# Patient Record
Sex: FEMALE | Race: WHITE
[De-identification: names, ages, dates, MRNs, and addresses within clinical notes are randomized per-mention and may not be internally consistent; named-entity substitution may affect disease eponyms.]

---

## 2017-01-09 ENCOUNTER — HOSPITAL ENCOUNTER (OUTPATIENT)
Dept: HOSPITAL 47 - RADUSWWP | Age: 27
Discharge: HOME | End: 2017-01-09
Payer: COMMERCIAL

## 2017-01-09 DIAGNOSIS — N64.4: Primary | ICD-10-CM

## 2017-01-09 NOTE — USB
Reason for exam: clinical finding.

Indicated problem(s): pain in the left breast.



Physical Findings:

Nurse did not find any significant physical abnormalities on exam.



US Breast LT

Left breast ultrasound including all four quadrants, the retroareolar region and 

axilla demonstrates no cystic or solid lesion seen.



These results were verbally communicated with the patient and result sheet given 

to the patient on 1/9/17.





ASSESSMENT: Negative, BI-RAD 1



RECOMMENDATION:

Routine screening mammogram of both breasts at age 40.

Manage patient on a clinical basis.

## 2019-07-15 ENCOUNTER — HOSPITAL ENCOUNTER (EMERGENCY)
Dept: HOSPITAL 47 - EC | Age: 29
Discharge: HOME | End: 2019-07-15
Payer: COMMERCIAL

## 2019-07-15 VITALS
TEMPERATURE: 98.2 F | SYSTOLIC BLOOD PRESSURE: 122 MMHG | HEART RATE: 71 BPM | DIASTOLIC BLOOD PRESSURE: 80 MMHG | RESPIRATION RATE: 18 BRPM

## 2019-07-15 DIAGNOSIS — Z88.2: ICD-10-CM

## 2019-07-15 DIAGNOSIS — F41.9: ICD-10-CM

## 2019-07-15 DIAGNOSIS — R06.00: ICD-10-CM

## 2019-07-15 DIAGNOSIS — J02.9: Primary | ICD-10-CM

## 2019-07-15 DIAGNOSIS — F17.200: ICD-10-CM

## 2019-07-15 PROCEDURE — 99284 EMERGENCY DEPT VISIT MOD MDM: CPT

## 2019-07-15 NOTE — ED
ENT HPI





- General


Chief complaint: ENT


Stated complaint: SORE THROAT, MICHELLE


Time Seen by Provider: 07/15/19 10:10


Source: patient


Mode of arrival: ambulatory


Limitations: no limitations





- History of Present Illness


Initial comments: 





Patient is a 28-year-old female who presents emergency Department with 

complaints of "throat tightening" 2 weeks.  Patient states she went to [facility] on Saturday for the same issue, had a rapid strep test which was negative,

but they gave her a prescription for amoxicillin and steroids.  Patient states 

she did not start the medications, called her doctor today has an appointment 

for later today but states she was getting really worked up and decided to come 

to the ER as well.  Patient states she has a history of anxiety but currently 

does not take any medications for.  Patient denies having fever, chills, cough, 

runny nose, shortness of breath, difficulty breathing, nausea, vomiting.  

Patient denies any other complaints at this time.





- Related Data


                                Home Medications











 Medication  Instructions  Recorded  Confirmed


 


No Known Home Medications  07/15/19 07/15/19











                                    Allergies











Allergy/AdvReac Type Severity Reaction Status Date / Time


 


sulfamethoxazole AdvReac  Nausea & Verified 07/15/19 10:06





[From Bactrim]   Vomiting  


 


trimethoprim [From Bactrim] AdvReac  Nausea & Verified 07/15/19 10:06





   Vomiting  














Review of Systems


ROS Statement: 


Those systems with pertinent positive or pertinent negative responses have been 

documented in the HPI.





ROS Other: All systems not noted in ROS Statement are negative.





Past Medical History


Past Medical History: Thyroid Disorder


Additional Past Medical History / Comment(s): migraines


History of Any Multi-Drug Resistant Organisms: None Reported


Past Surgical History: No Surgical Hx Reported


Past Psychological History: Anxiety


Smoking Status: Current every day smoker


Past Alcohol Use History: Occasional


Past Drug Use History: None Reported





General Exam





- General Exam Comments


Initial Comments: 





GENERAL: Well-appearing, well-nourished and in no acute distress.


HEAD: Atraumatic, normocephalic.


EYES: Pupils equal round and reactive to light, extraocular movements intact, 

sclera anicteric, conjunctiva are normal.


ENT: TMs normal, nares patent, oropharynx clear without exudates.  Moist mucous 

membranes.


NECK: Normal range of motion, supple without lymphadenopathy or JVD.


LUNGS: Breath sounds clear to auscultation bilaterally and equal.  No wheezes 

rales or rhonchi.


HEART: Regular rate and rhythm without murmurs, rubs or gallops.


ABDOMEN: Soft, nontender, normoactive bowel sounds.  No guarding, no rebound.  

No masses appreciated.


: Deferred 


EXTREMITIES: Normal range of motion, no pitting or edema.  No clubbing or 

cyanosis.


NEUROLOGICAL: Cranial nerves II through XII grossly intact.  Normal speech, 

normal gait.


PSYCH: Normal mood, normal affect.


SKIN: Warm, Dry, normal turgor, no rashes or lesions noted.


Limitations: no limitations





Course


                                   Vital Signs











  07/15/19





  09:56


 


Temperature 98.2 F


 


Pulse Rate 71


 


Respiratory 18





Rate 


 


Blood Pressure 122/80


 


O2 Sat by Pulse 98





Oximetry 














Medical Decision Making





- Medical Decision Making





Patient is a 28-year-old female presenting with sore throat 2 weeks.  Patient 

has history of anxiety as well.  Patient is describing her symptoms as, "throat 

tightening".  Patient went to [facility] on Saturday had rapid strep test 

which was negative and was prescribed amoxicillin and steroids.  Patient did not

take the medications.  Patient is exam today is completely within normal limits.

 Patient denies fever, chills, shortness of breath, difficulty breathing.  Was 

discussed with patient that her symptoms are probably related to her anxiety.  

Patient has an appointment with her PCP later on today.  We discussed going to 

appointment before starting any medications.  Patient will be discharged home.  

Patient is okay with this plan.  Return parameters were discussed and she 

verbalized understanding.  Case discussed with Dr. Banks.





Disposition


Clinical Impression: 


 Sore throat, Anxiety





Disposition: HOME SELF-CARE


Condition: Stable


Instructions (If sedation given, give patient instructions):  Anxiety (ED)


Additional Instructions: 


Please return to the Emergency Department if symptoms worsen or any other 

concerns.


Follow-up with PCP as discussed.


Is patient prescribed a controlled substance at d/c from ED?: No


Referrals: 


Swapna Menon MD [Primary Care Provider] - 1-2 days

## 2019-09-25 ENCOUNTER — HOSPITAL ENCOUNTER (EMERGENCY)
Dept: HOSPITAL 47 - EC | Age: 29
Discharge: HOME | End: 2019-09-25
Payer: COMMERCIAL

## 2019-09-25 VITALS — HEART RATE: 77 BPM | DIASTOLIC BLOOD PRESSURE: 87 MMHG | SYSTOLIC BLOOD PRESSURE: 106 MMHG

## 2019-09-25 VITALS — TEMPERATURE: 98.4 F | RESPIRATION RATE: 16 BRPM

## 2019-09-25 DIAGNOSIS — F41.9: ICD-10-CM

## 2019-09-25 DIAGNOSIS — R51: Primary | ICD-10-CM

## 2019-09-25 DIAGNOSIS — Z53.20: ICD-10-CM

## 2019-09-25 DIAGNOSIS — Z86.69: ICD-10-CM

## 2019-09-25 DIAGNOSIS — F32.9: ICD-10-CM

## 2019-09-25 DIAGNOSIS — Z88.1: ICD-10-CM

## 2019-09-25 DIAGNOSIS — Z88.2: ICD-10-CM

## 2019-09-25 DIAGNOSIS — R11.0: ICD-10-CM

## 2019-09-25 DIAGNOSIS — Z79.899: ICD-10-CM

## 2019-09-25 DIAGNOSIS — H53.149: ICD-10-CM

## 2019-09-25 DIAGNOSIS — F17.200: ICD-10-CM

## 2019-09-25 PROCEDURE — 99284 EMERGENCY DEPT VISIT MOD MDM: CPT

## 2019-09-25 PROCEDURE — 96375 TX/PRO/DX INJ NEW DRUG ADDON: CPT

## 2019-09-25 PROCEDURE — 96365 THER/PROPH/DIAG IV INF INIT: CPT

## 2019-09-25 PROCEDURE — 70450 CT HEAD/BRAIN W/O DYE: CPT

## 2019-09-25 NOTE — ED
Headache HPI





- General


Chief Complaint: Headache


Stated Complaint: headache


Time Seen by Provider: 09/25/19 17:48


Source: RN notes reviewed, old records reviewed


Mode of arrival: ambulatory


Limitations: no limitations





- History of Present Illness


Initial Comments: 





This is a 20-year-old female the ER for evaluation.  Patient resents today for 

evaluation regards to headache.  History of headache history of migraines.  

Patient takes using over-the-counter medication symptoms resolved his headache 

is been for a month.  Prior history of MRI showing no issues no aneurysm.  

Patient has current headache with mild nausea.  No current vomiting.  No trauma.

 No fevers she was treated for frontal sinusitis with antibiotics those have 

resolved and patient's headache has remained


MD Complaint: headache


-: month(s)


Onset Description: gradual


Location: frontal


Severity: mild


Severity scale (1-10): 3


Quality: aching, throbbing


Consistency: constant


Improves With: nothing


Worsens With: none


Associated Symptoms: nausea, photophobia, sensitivity to sound


Treatments Prior to Arrival: none





- Related Data


                                Home Medications











 Medication  Instructions  Recorded  Confirmed


 


Escitalopram [Lexapro] 5 mg PO HS 09/25/19 09/25/19











                                    Allergies











Allergy/AdvReac Type Severity Reaction Status Date / Time


 


sulfamethoxazole AdvReac  Nausea & Verified 09/25/19 18:26





[From Bactrim]   Vomiting  


 


trimethoprim [From Bactrim] AdvReac  Nausea & Verified 09/25/19 18:26





   Vomiting  














Review of Systems


ROS Statement: 


Those systems with pertinent positive or pertinent negative responses have been 

documented in the HPI.





ROS Other: All systems not noted in ROS Statement are negative.





Past Medical History


Past Medical History: Thyroid Disorder


Additional Past Medical History / Comment(s): migraines


History of Any Multi-Drug Resistant Organisms: None Reported


Past Surgical History: No Surgical Hx Reported


Past Psychological History: Anxiety, Depression


Smoking Status: Current every day smoker


Past Alcohol Use History: None Reported


Past Drug Use History: None Reported





General Exam


Limitations: no limitations


General appearance: alert, in no apparent distress


Head exam: Present: atraumatic, normocephalic, normal inspection


Eye exam: Present: normal appearance, PERRL, EOMI.  Absent: scleral icterus, 

conjunctival injection, periorbital swelling


ENT exam: Present: normal exam, mucous membranes moist


Neck exam: Present: normal inspection.  Absent: tenderness, meningismus, 

lymphadenopathy


Respiratory exam: Present: normal lung sounds bilaterally.  Absent: respiratory 

distress, wheezes, rales, rhonchi, stridor


Cardiovascular Exam: Present: regular rate, normal rhythm, normal heart sounds. 

Absent: systolic murmur, diastolic murmur, rubs, gallop, clicks


GI/Abdominal exam: Present: soft, normal bowel sounds.  Absent: distended, 

tenderness, guarding, rebound, rigid


Extremities exam: Present: normal inspection, full ROM, normal capillary refill.

 Absent: tenderness, pedal edema, joint swelling, calf tenderness


Back exam: Present: normal inspection


Neurological exam: Present: alert, oriented X3, CN II-XII intact


Psychiatric exam: Present: normal affect, normal mood


Skin exam: Present: warm, dry, intact, normal color.  Absent: rash





Course


                                   Vital Signs











  09/25/19





  17:13


 


Temperature 98.4 F


 


Pulse Rate 88


 


Respiratory 16





Rate 


 


Blood Pressure 111/63


 


O2 Sat by Pulse 99





Oximetry 














- Reevaluation(s)


Reevaluation #1: 





09/25/19 18:43


Record is reviewed including prior MRI


Reevaluation #2: 





09/25/19 19:09


patient headache is improved





Medical Decision Making





- Medical Decision Making





8 female the ER with multiple headache.  CT brain is negative for acute disease 

headache is resolved and patient can be discharged home





- Radiology Data


Radiology results: report reviewed (CT brain is negative for acute disease), 

image reviewed





Disposition


Clinical Impression: 


 Headache





Disposition: HOME SELF-CARE


Instructions (If sedation given, give patient instructions):  Acute Headache 

(ED)


Is patient prescribed a controlled substance at d/c from ED?: No


Referrals: 


Swapna Menon MD [Primary Care Provider] - 1-2 days

## 2019-09-25 NOTE — CT
EXAMINATION TYPE: CT brain wo con

 

DATE OF EXAM: 9/25/2019

 

COMPARISON: NONE

 

HISTORY: HA x1 month

 

CT DLP: 1076.4 mGycm.  Automated Exposure Control for Dose Reduction was Utilized.

 

 

TECHNIQUE: CT scan of the head is performed without contrast.

 

FINDINGS:   There is no acute intracranial hemorrhage, mass effect, or midline shift identified. No s
uspicious extra axial fluid collection. The ventricles and sulci are within normal limits in size.  T
he globes are intact and the visualized sinuses are clear. Leftward nasal septal spur is incidentally
 seen. Calvarium appears intact.

 

IMPRESSION:  No acute intracranial hemorrhage, mass effect, or midline shift is seen. If headaches pe
rsist MRI would be recommended.

## 2020-10-29 ENCOUNTER — HOSPITAL ENCOUNTER (OUTPATIENT)
Dept: HOSPITAL 47 - RADUSWWP | Age: 30
Discharge: HOME | End: 2020-10-29
Attending: FAMILY MEDICINE
Payer: COMMERCIAL

## 2020-10-29 DIAGNOSIS — R10.811: Primary | ICD-10-CM

## 2020-10-29 PROCEDURE — 76700 US EXAM ABDOM COMPLETE: CPT

## 2020-10-29 NOTE — US
EXAMINATION TYPE: US abdomen complete

 

DATE OF EXAM: 10/29/2020

 

COMPARISON: NONE

 

CLINICAL HISTORY: R10.811 Right upper quadrant abdominal tenderness. Intermittent RUQ pain x 1 months
, change in bowel habits.

 

EXAM MEASUREMENTS:

 

Liver Length:  11.4 cm   

Gallbladder Wall:  0.2 cm   

CBD:  0.3 cm

Spleen:  9.6 cm   

Right Kidney:  8.3 x 5.2 x 4.3 cm 

Left Kidney:  9.3 x 4.8 x 4.8 cm   

 

 

 

Pancreas:  Normal where visualized; tail obscured by overlying bowel gas

Liver: Normal.

Gallbladder:  Normal

**Sonographer reports negative sonographic Landin sign

CBD:  Normal 

Spleen:  Normal   

Right Kidney:  Normal   

Left Kidney:  Normal   

Upper IVC:  Normal  

Abd Aorta:  Normal

 

 

IMPRESSION: No acute findings to explain patient's right upper quadrant pain. No cholelithiasis or ac
imguel cholecystitis.

## 2024-09-09 ENCOUNTER — HOSPITAL ENCOUNTER (EMERGENCY)
Dept: HOSPITAL 47 - EC | Age: 34
LOS: 1 days | Discharge: HOME | End: 2024-09-10
Payer: COMMERCIAL

## 2024-09-09 VITALS — RESPIRATION RATE: 18 BRPM

## 2024-09-09 LAB
ALBUMIN SERPL-MCNC: 4.4 G/DL (ref 3.5–5)
ALP SERPL-CCNC: 51 U/L (ref 38–126)
ALT SERPL-CCNC: 16 U/L (ref 4–34)
ANION GAP SERPL CALC-SCNC: 5 MMOL/L
AST SERPL-CCNC: 22 U/L (ref 14–36)
BASOPHILS # BLD AUTO: 0.1 K/UL (ref 0–0.2)
BASOPHILS NFR BLD AUTO: 1 %
BUN SERPL-SCNC: 9 MG/DL (ref 7–17)
CALCIUM SPEC-MCNC: 9.7 MG/DL (ref 8.4–10.2)
CHLORIDE SERPL-SCNC: 102 MMOL/L (ref 98–107)
CO2 SERPL-SCNC: 25 MMOL/L (ref 22–30)
EOSINOPHIL # BLD AUTO: 0 K/UL (ref 0–0.7)
EOSINOPHIL NFR BLD AUTO: 1 %
ERYTHROCYTE [DISTWIDTH] IN BLOOD BY AUTOMATED COUNT: 4.84 M/UL (ref 3.8–5.4)
ERYTHROCYTE [DISTWIDTH] IN BLOOD: 13.9 % (ref 11.5–15.5)
GLUCOSE BLD-MCNC: 96 MG/DL (ref 70–110)
GLUCOSE SERPL-MCNC: 88 MG/DL (ref 74–99)
HCT VFR BLD AUTO: 39.8 % (ref 34–46)
HGB BLD-MCNC: 13.5 GM/DL (ref 11.4–16)
LYMPHOCYTES # SPEC AUTO: 1.7 K/UL (ref 1–4.8)
LYMPHOCYTES NFR SPEC AUTO: 20 %
MAGNESIUM SPEC-SCNC: 2.2 MG/DL (ref 1.6–2.3)
MCH RBC QN AUTO: 27.9 PG (ref 25–35)
MCHC RBC AUTO-ENTMCNC: 33.9 G/DL (ref 31–37)
MCV RBC AUTO: 82.3 FL (ref 80–100)
MONOCYTES # BLD AUTO: 0.3 K/UL (ref 0–1)
MONOCYTES NFR BLD AUTO: 3 %
NEUTROPHILS # BLD AUTO: 6.4 K/UL (ref 1.3–7.7)
NEUTROPHILS NFR BLD AUTO: 75 %
PH UR: 6.5 [PH] (ref 5–8)
PLATELET # BLD AUTO: 233 K/UL (ref 150–450)
POTASSIUM SERPL-SCNC: 4 MMOL/L (ref 3.5–5.1)
PROT SERPL-MCNC: 6.6 G/DL (ref 6.3–8.2)
SODIUM SERPL-SCNC: 132 MMOL/L (ref 137–145)
SP GR UR: 1 (ref 1–1.03)
UROBILINOGEN UR QL STRIP: <2 MG/DL (ref ?–2)
WBC # BLD AUTO: 8.5 K/UL (ref 3.8–10.6)

## 2024-09-09 PROCEDURE — 81025 URINE PREGNANCY TEST: CPT

## 2024-09-09 PROCEDURE — 85025 COMPLETE CBC W/AUTO DIFF WBC: CPT

## 2024-09-09 PROCEDURE — 84443 ASSAY THYROID STIM HORMONE: CPT

## 2024-09-09 PROCEDURE — 81003 URINALYSIS AUTO W/O SCOPE: CPT

## 2024-09-09 PROCEDURE — 99284 EMERGENCY DEPT VISIT MOD MDM: CPT

## 2024-09-09 PROCEDURE — 83735 ASSAY OF MAGNESIUM: CPT

## 2024-09-09 PROCEDURE — 80053 COMPREHEN METABOLIC PANEL: CPT

## 2024-09-09 PROCEDURE — 93005 ELECTROCARDIOGRAM TRACING: CPT

## 2024-09-09 PROCEDURE — 36415 COLL VENOUS BLD VENIPUNCTURE: CPT

## 2024-09-09 NOTE — ED
Dizziness HPI





- General


Source: patient, RN notes reviewed





<Sunita Quinn - Last Filed: 09/09/24 22:52>





- History of Present Illness


MD Complaint: near syncope





<Pavel Sheikh - Last Filed: 09/17/24 09:19>





- General


Stated Complaint: Dizziness


Time Seen by Provider: 09/09/24 22:40





- History of Present Illness


Initial Comments: 





Quick note-33-year-old female presents emergency department chief complaint of 

dizziness and presyncopal symptoms.  She states that while she was at work she 

felt like she was "going to pass out "felt dizzy and lightheaded.  Patient 

states that she was provided with orange juice and symptoms mildly improved.  

Currently she is denying chest pain, shortness of breath, palpitations, 

headache, blurry or double vision.  (Sunita Quinn)





Agree with above history (Pavel Sheikh)





- Related Data


                                Home Medications











 Medication  Instructions  Recorded  Confirmed


 


Escitalopram [Lexapro] 5 mg PO HS 09/25/19 09/25/19











                                    Allergies











Allergy/AdvReac Type Severity Reaction Status Date / Time


 


sulfamethoxazole AdvReac  Nausea & Verified 09/09/24 22:51





[From Bactrim]   Vomiting  


 


trimethoprim [From Bactrim] AdvReac  Nausea & Verified 09/09/24 22:51





   Vomiting  














Review of Systems


ROS Other: All systems not noted in ROS Statement are negative.





<Sunita Quinn - Last Filed: 09/09/24 22:52>


ROS Other: All systems not noted in ROS Statement are negative.





<Pavel Sheikh - Last Filed: 09/17/24 09:19>


ROS Statement: 


Those systems with pertinent positive or pertinent negative responses have been 

documented in the HPI.








Past Medical History


Past Medical History: Thyroid Disorder


Additional Past Medical History / Comment(s): migraines


History of Any Multi-Drug Resistant Organisms: None Reported


Past Surgical History: No Surgical Hx Reported


Past Psychological History: Anxiety, Depression


Past Alcohol Use History: None Reported


Past Drug Use History: None Reported





<Sunita Quinn - Last Filed: 09/09/24 22:52>





General Exam





<Sunita Quinn - Last Filed: 09/09/24 22:52>


Limitations: no limitations


General appearance: alert, in no apparent distress


Head exam: Present: atraumatic, normocephalic


Eye exam: Present: normal appearance, PERRL, EOMI.  Absent: scleral icterus, 

conjunctival injection, nystagmus


Neck exam: Present: normal inspection, full ROM


Respiratory exam: Present: normal lung sounds bilaterally.  Absent: respiratory 

distress, wheezes, rales, rhonchi, stridor, accessory muscle use


Cardiovascular Exam: Present: regular rate, normal rhythm, normal heart sounds. 

Absent: systolic murmur, diastolic murmur, rubs, gallop


GI/Abdominal exam: Present: soft.  Absent: distended, tenderness, guarding, 

rebound, rigid, mass


Extremities exam: Present: normal inspection, normal capillary refill.  Absent: 

pedal edema, calf tenderness


Back exam: Present: normal inspection.  Absent: CVA tenderness (R), CVA 

tenderness (L)


Neurological exam: Present: alert


Skin exam: Present: warm, dry, intact, normal color.  Absent: rash





<Pavel Sheikh - Last Filed: 09/17/24 09:19>





- General Exam Comments


Initial Comments: 


Visual Physical Exam


 


Vital signs reviewed


 


General: Well-appearing, nontoxic, no acute distress.


Head: Normocephalic, atraumatic


Eyes: PERRLA, EOMI


ENT: Airway patent


Chest: Nonlabored breathing


Skin: No visual rash, normal skin tone


Neuro: Alert and oriented 3


Musculoskeletal: No gross abnormalities





 (Sunita Quinn)





Course


                                   Vital Signs











  09/09/24 09/10/24 09/10/24





  22:51 02:23 02:55


 


Temperature 98.3 F  98.4 F


 


Pulse Rate 91 94 


 


Respiratory 18 18 





Rate   


 


Blood Pressure 115/68 101/67 


 


O2 Sat by Pulse 100 100 





Oximetry   














EKG Findings





- EKG Results:


EKG: interpreted by ERMD, sinus rhythm (Rate 92 bpm), normal axis, normal QRS, 

normal ST/T





<Pavel Sheikh - Last Filed: 09/17/24 09:19>





Medical Decision Making





<Sunita Quinn - Last Filed: 09/09/24 22:52>





- Lab Data


Result diagrams: 


                                 09/09/24 22:57





                                 09/09/24 22:57





<Pavel Sheikh - Last Filed: 09/17/24 09:19>





- Medical Decision Making


I completed the quick note portion of this chart signed Sunita Quinn PA-C


 (Sunita Quinn)








Was pt. sent in by a medical professional or institution (, PA, NP, urgent 

care, hospital, or nursing home...) When possible be specific


@  -[No]


Did you speak to anyone other than the patient for history (EMS, parent, family,

 police, friend...)? What history was obtained from this source 


@  -[No]


Did you review nursing and triage notes (agree or disagree)?  Why? 


@  -[I reviewed and agree with nursing and triage notes]


Were old charts reviewed (outside hosp., previous admission, EMS record, old 

EKG, old radiological studies, urgent care reports/EKG's, nursing home records)?

Report findings 


@  -[No old charts were reviewed]


Differential Diagnosis (chest pain, altered mental status, abdominal pain women,

abdominal pain men, vaginal bleeding, weakness, fever, dyspnea, syncope, 

headache, dizziness, GI bleed, back pain, seizure, CVA, palpatations, mental 

health, musculoskeletal)? 


@  -[Differential Syncope:


Valvular disease, hypertrophic cardiomyopathy, pulmonary embolism, tamponade, 

tachycardia, bradycardia, MI, hypovolemia, hemorrhage, dissection, anemia, 

intracranial hemorrhage, seizure, hypoglycemia, carbon monoxide poisoning, this 

is not meant to be an all-inclusive list.





EKG interpreted by me (3pts min.).


@  -[I interpreted as above]


X-rays interpreted by me (1pt min.).


@  -[None done]


CT interpreted by me (1pt min.).


@  -[None done]


U/S interpreted by me (1pt. min.).


@  -[None done]


What testing was considered but not performed or refused? (CT, X-rays, U/S, 

labs)? Why?


@  -[None]


What meds were considered but not given or refused? Why?


@  -[None]


Did you discuss the management of the patient with other professionals 

(professionals i.e. , PA, NP, lab, RT, psych nurse, , , 

teacher, , )? Give summary


@  -[No]


Was smoking cessation discussed for >3mins.?


@  -[No]


Was critical care preformed (if so, how long)?


@  -[No]


Were there social determinants of health that impacted care today? How? 

(Homelessness, low income, unemployed, alcoholism, drug addiction, 

transportation, low edu. Level, literacy, decrease access to med. care, custodial, r

ehab)?


@  -[No]


Was there de-escalation of care discussed even if they declined (Discuss DNR or 

withdrawal of care, Hospice)? DNR status


@  -[No]


What co-morbidities impacted this encounter? (DM, HTN, Smoking, COPD, CAD, 

Cancer, CVA, ARF, Chemo, Hep., AIDS, mental health diagnosis, sleep apnea, morb

id obesity)?


@  -[None]


Was patient admitted / discharged? Hospital course, mention meds given and r

oute, prescriptions, significant lab abnormalities, going to OR and other 

pertinent info.


@  -[Patient is a 33-year-old woman here to have evaluation after near syncopal 

episode.  The patient has returned to baseline and feeling well.  The exam and 

workup not revealing of any concerning features.  At this point stable to have 

further care as outpatient


Undiagnosed new problem with uncertain prognosis?


@  -[No]


Drug Therapy requiring intensive monitoring for toxicity (Heparin, Nitro, 

Insulin, Cardizem)?


@  -[No]


Were any procedures done?


@  -[No]


Diagnosis/symptom?


@  -Acute near syncopal episode


Acute, or Chronic, or Acute on Chronic?


@  -[Acute


Uncomplicated (without systemic symptoms) or Complicated (systemic symptoms)?


@  -[Uncomplicated


Side effects of treatment?


@  -[No]


Exacerbation, Progression, or Severe Exacerbation?


@  -[No]


Poses a threat to life or bodily function? How? (Chest pain, USA, MI, pneumonia,

 PE, COPD, DKA, ARF, appy, cholecystitis, CVA, Diverticulitis, Homicidal, 

Suicidal, threat to staff... and all critical care pts)


@  -[No]


 (Pavel Sheikh)





- Lab Data


                                   Lab Results











  09/09/24 09/09/24 09/09/24 Range/Units





  22:57 22:57 22:57 


 


WBC  8.5    (3.8-10.6)  k/uL


 


RBC  4.84    (3.80-5.40)  m/uL


 


Hgb  13.5    (11.4-16.0)  gm/dL


 


Hct  39.8    (34.0-46.0)  %


 


MCV  82.3    (80.0-100.0)  fL


 


MCH  27.9    (25.0-35.0)  pg


 


MCHC  33.9    (31.0-37.0)  g/dL


 


RDW  13.9    (11.5-15.5)  %


 


Plt Count  233    (150-450)  k/uL


 


MPV  8.8    


 


Neutrophils %  75    %


 


Lymphocytes %  20    %


 


Monocytes %  3    %


 


Eosinophils %  1    %


 


Basophils %  1    %


 


Neutrophils #  6.4    (1.3-7.7)  k/uL


 


Lymphocytes #  1.7    (1.0-4.8)  k/uL


 


Monocytes #  0.3    (0-1.0)  k/uL


 


Eosinophils #  0.0    (0-0.7)  k/uL


 


Basophils #  0.1    (0-0.2)  k/uL


 


Sodium     (137-145)  mmol/L


 


Potassium     (3.5-5.1)  mmol/L


 


Chloride     ()  mmol/L


 


Carbon Dioxide     (22-30)  mmol/L


 


Anion Gap     mmol/L


 


BUN     (7-17)  mg/dL


 


Creatinine     (0.52-1.04)  mg/dL


 


Est GFR (CKD-EPI)AfAm     (>60 ml/min/1.73 sqM)  


 


Est GFR (CKD-EPI)NonAf     (>60 ml/min/1.73 sqM)  


 


Glucose     (74-99)  mg/dL


 


POC Glucose (mg/dL)     ()  mg/dL


 


POC Glu Operater ID     


 


Calcium     (8.4-10.2)  mg/dL


 


Magnesium     (1.6-2.3)  mg/dL


 


Total Bilirubin     (0.2-1.3)  mg/dL


 


AST     (14-36)  U/L


 


ALT     (4-34)  U/L


 


Alkaline Phosphatase     ()  U/L


 


Total Protein     (6.3-8.2)  g/dL


 


Albumin     (3.5-5.0)  g/dL


 


TSH     (0.465-4.680)  mIU/L


 


Urine Color   Colorless   


 


Urine Appearance   Clear   (Clear)  


 


Urine pH   6.5   (5.0-8.0)  


 


Ur Specific Gravity   1.001   (1.001-1.035)  


 


Urine Protein   Negative   (Negative)  


 


Urine Glucose (UA)   Negative   (Negative)  


 


Urine Ketones   Negative   (Negative)  


 


Urine Blood   Negative   (Negative)  


 


Urine Nitrite   Negative   (Negative)  


 


Urine Bilirubin   Negative   (Negative)  


 


Urine Urobilinogen   <2.0   (<2.0)  mg/dL


 


Ur Leukocyte Esterase   Negative   (Negative)  


 


Urine HCG, Qual    Not Detected  (Not Detectd)  














  09/09/24 09/09/24 09/09/24 Range/Units





  22:57 22:57 22:58 


 


WBC     (3.8-10.6)  k/uL


 


RBC     (3.80-5.40)  m/uL


 


Hgb     (11.4-16.0)  gm/dL


 


Hct     (34.0-46.0)  %


 


MCV     (80.0-100.0)  fL


 


MCH     (25.0-35.0)  pg


 


MCHC     (31.0-37.0)  g/dL


 


RDW     (11.5-15.5)  %


 


Plt Count     (150-450)  k/uL


 


MPV     


 


Neutrophils %     %


 


Lymphocytes %     %


 


Monocytes %     %


 


Eosinophils %     %


 


Basophils %     %


 


Neutrophils #     (1.3-7.7)  k/uL


 


Lymphocytes #     (1.0-4.8)  k/uL


 


Monocytes #     (0-1.0)  k/uL


 


Eosinophils #     (0-0.7)  k/uL


 


Basophils #     (0-0.2)  k/uL


 


Sodium  132 L    (137-145)  mmol/L


 


Potassium  4.0    (3.5-5.1)  mmol/L


 


Chloride  102    ()  mmol/L


 


Carbon Dioxide  25    (22-30)  mmol/L


 


Anion Gap  5    mmol/L


 


BUN  9    (7-17)  mg/dL


 


Creatinine  0.78    (0.52-1.04)  mg/dL


 


Est GFR (CKD-EPI)AfAm  >90    (>60 ml/min/1.73 sqM)  


 


Est GFR (CKD-EPI)NonAf  >90    (>60 ml/min/1.73 sqM)  


 


Glucose  88    (74-99)  mg/dL


 


POC Glucose (mg/dL)    96  ()  mg/dL


 


POC Glu Operater ID    Jai Cm  


 


Calcium  9.7    (8.4-10.2)  mg/dL


 


Magnesium  2.2    (1.6-2.3)  mg/dL


 


Total Bilirubin  0.6    (0.2-1.3)  mg/dL


 


AST  22    (14-36)  U/L


 


ALT  16    (4-34)  U/L


 


Alkaline Phosphatase  51    ()  U/L


 


Total Protein  6.6    (6.3-8.2)  g/dL


 


Albumin  4.4    (3.5-5.0)  g/dL


 


TSH   2.040   (0.465-4.680)  mIU/L


 


Urine Color     


 


Urine Appearance     (Clear)  


 


Urine pH     (5.0-8.0)  


 


Ur Specific Gravity     (1.001-1.035)  


 


Urine Protein     (Negative)  


 


Urine Glucose (UA)     (Negative)  


 


Urine Ketones     (Negative)  


 


Urine Blood     (Negative)  


 


Urine Nitrite     (Negative)  


 


Urine Bilirubin     (Negative)  


 


Urine Urobilinogen     (<2.0)  mg/dL


 


Ur Leukocyte Esterase     (Negative)  


 


Urine HCG, Qual     (Not Detectd)  














Disposition





<Sunita Quinn - Last Filed: 09/09/24 22:52>


Is patient prescribed a controlled substance at d/c from ED?: No





<Pavel Sheikh - Last Filed: 09/17/24 09:19>


Clinical Impression: 


 Near syncope





Disposition: HOME SELF-CARE


Condition: Good


Instructions (If sedation given, give patient instructions):  Near Syncope (ED)


Referrals: 


Swapna Menon MD [Primary Care Provider] - 1-2 days

## 2024-09-10 VITALS — SYSTOLIC BLOOD PRESSURE: 101 MMHG | HEART RATE: 94 BPM | DIASTOLIC BLOOD PRESSURE: 67 MMHG

## 2024-09-10 VITALS — TEMPERATURE: 98.4 F

## 2025-03-07 ENCOUNTER — HOSPITAL ENCOUNTER (EMERGENCY)
Dept: HOSPITAL 47 - EC | Age: 35
LOS: 1 days | Discharge: HOME | End: 2025-03-08
Payer: COMMERCIAL

## 2025-03-07 DIAGNOSIS — F17.290: ICD-10-CM

## 2025-03-07 DIAGNOSIS — R52: Primary | ICD-10-CM

## 2025-03-07 LAB
ALBUMIN SERPL-MCNC: 4.5 G/DL (ref 3.5–5)
ALP SERPL-CCNC: 70 U/L (ref 38–126)
ALT SERPL-CCNC: 21 U/L (ref 4–34)
ANION GAP SERPL CALC-SCNC: 8 MMOL/L
AST SERPL-CCNC: 29 U/L (ref 14–36)
BASOPHILS # BLD AUTO: 0 K/UL (ref 0–0.2)
BASOPHILS NFR BLD AUTO: 0 %
BUN SERPL-SCNC: 13 MG/DL (ref 7–17)
CALCIUM SPEC-MCNC: 9.4 MG/DL (ref 8.4–10.2)
CHLORIDE SERPL-SCNC: 103 MMOL/L (ref 98–107)
CO2 SERPL-SCNC: 26 MMOL/L (ref 22–30)
EOSINOPHIL # BLD AUTO: 0.1 K/UL (ref 0–0.7)
EOSINOPHIL NFR BLD AUTO: 1 %
ERYTHROCYTE [DISTWIDTH] IN BLOOD BY AUTOMATED COUNT: 5.15 M/UL (ref 3.8–5.4)
ERYTHROCYTE [DISTWIDTH] IN BLOOD: 13.6 % (ref 11.5–15.5)
GLUCOSE SERPL-MCNC: 83 MG/DL (ref 74–99)
HCT VFR BLD AUTO: 43.4 % (ref 34–46)
HGB BLD-MCNC: 14.1 GM/DL (ref 11.4–16)
LYMPHOCYTES # SPEC AUTO: 2.2 K/UL (ref 1–4.8)
LYMPHOCYTES NFR SPEC AUTO: 24 %
MCH RBC QN AUTO: 27.4 PG (ref 25–35)
MCHC RBC AUTO-ENTMCNC: 32.5 G/DL (ref 31–37)
MCV RBC AUTO: 84.4 FL (ref 80–100)
MONOCYTES # BLD AUTO: 0.2 K/UL (ref 0–1)
MONOCYTES NFR BLD AUTO: 2 %
NEUTROPHILS # BLD AUTO: 6.8 K/UL (ref 1.3–7.7)
NEUTROPHILS NFR BLD AUTO: 72 %
PH UR: 6.5 [PH] (ref 5–8)
PLATELET # BLD AUTO: 283 K/UL (ref 150–450)
POTASSIUM SERPL-SCNC: 3.8 MMOL/L (ref 3.5–5.1)
PROT SERPL-MCNC: 7.1 G/DL (ref 6.3–8.2)
RBC UR QL: 57 /HPF (ref 0–5)
SODIUM SERPL-SCNC: 137 MMOL/L (ref 137–145)
SP GR UR: 1 (ref 1–1.03)
SQUAMOUS UR QL AUTO: <1 /HPF (ref 0–4)
UROBILINOGEN UR QL STRIP: <2 MG/DL (ref ?–2)
WBC # BLD AUTO: 9.5 K/UL (ref 3.8–10.6)
WBC # UR AUTO: 1 /HPF (ref 0–5)

## 2025-03-07 PROCEDURE — 81025 URINE PREGNANCY TEST: CPT

## 2025-03-07 PROCEDURE — 81001 URINALYSIS AUTO W/SCOPE: CPT

## 2025-03-07 PROCEDURE — 93005 ELECTROCARDIOGRAM TRACING: CPT

## 2025-03-07 PROCEDURE — 99284 EMERGENCY DEPT VISIT MOD MDM: CPT

## 2025-03-07 PROCEDURE — 36415 COLL VENOUS BLD VENIPUNCTURE: CPT

## 2025-03-07 PROCEDURE — 96361 HYDRATE IV INFUSION ADD-ON: CPT

## 2025-03-07 PROCEDURE — 85025 COMPLETE CBC W/AUTO DIFF WBC: CPT

## 2025-03-07 PROCEDURE — 87636 SARSCOV2 & INF A&B AMP PRB: CPT

## 2025-03-07 PROCEDURE — 84443 ASSAY THYROID STIM HORMONE: CPT

## 2025-03-07 PROCEDURE — 96374 THER/PROPH/DIAG INJ IV PUSH: CPT

## 2025-03-07 PROCEDURE — 80053 COMPREHEN METABOLIC PANEL: CPT

## 2025-03-07 RX ADMIN — CEFAZOLIN ONE MLS/HR: 330 INJECTION, POWDER, FOR SOLUTION INTRAMUSCULAR; INTRAVENOUS at 22:39

## 2025-03-07 RX ADMIN — KETOROLAC TROMETHAMINE STA MG: 15 INJECTION, SOLUTION INTRAMUSCULAR; INTRAVENOUS at 22:40

## 2025-03-07 NOTE — ED
Recheck HPI





- General


Chief Complaint: Recheck/Abnormal Lab/Rx


Stated Complaint: Numbness/Tingling in back and shoulder


Time Seen by Provider: 03/07/25 22:34


Source: patient, RN notes reviewed


Mode of arrival: wheelchair


Limitations: no limitations





- History of Present Illness


Initial Comments: 


34-year-old female presented the ER for evaluation of sore pain all over.  

Patient reports this has been an ongoing issue and today while at work she bent 

over to pick something up and upon standing she had an extreme dizzy/lightheaded

spell.  She describes it as the room was spinning.  Patient was able to sit down

with resolution of symptoms.  Patient went to lunch and continued to feel 

abnormal.  She was reporting a sore sensation to her upper back and shoulders 

along with her posterior thighs.  No injuries to this area.  Patient denies any 

headache, chest pain, shortness of breath, abdominal pain, 

constipation/diarrhea, urinary complaints or peripheral edema.  Patient reports 

she is on her menstrual cycle.  No fevers or chills.  Patient has taken 

over-the-counter Aleve without relief of symptoms. No other complaints.





- Related Data


                                Home Medications











 Medication  Instructions  Recorded  Confirmed


 


Escitalopram [Lexapro] 5 mg PO HS 09/25/19 09/25/19











                                    Allergies











Allergy/AdvReac Type Severity Reaction Status Date / Time


 


sulfamethoxazole AdvReac  Nausea & Verified 03/07/25 21:55





[From Bactrim]   Vomiting  


 


trimethoprim [From Bactrim] AdvReac  Nausea & Verified 03/07/25 21:55





   Vomiting  














Review of Systems


ROS Statement: 


Those systems with pertinent positive or pertinent negative responses have been 

documented in the HPI.





ROS Other: All systems not noted in ROS Statement are negative.





Past Medical History


Past Medical History: Thyroid Disorder


Additional Past Medical History / Comment(s): migraines


History of Any Multi-Drug Resistant Organisms: None Reported


Past Surgical History: No Surgical Hx Reported


Past Psychological History: Anxiety, Depression


Smoking Status: Vaper


Past Alcohol Use History: None Reported


Past Drug Use History: None Reported





General Exam


Limitations: no limitations


General appearance: alert, in no apparent distress


Respiratory exam: Present: normal lung sounds bilaterally.  Absent: respiratory 

distress, wheezes, rales, rhonchi, stridor


Cardiovascular Exam: Present: regular rate, normal rhythm, normal heart sounds. 

Absent: systolic murmur, diastolic murmur, rubs, gallop, clicks


GI/Abdominal exam: Present: soft, normal bowel sounds.  Absent: distended, 

tenderness, guarding, rebound, rigid


Neurological exam: Present: alert, oriented X3, CN II-XII intact


Skin exam: Present: warm, dry, intact, normal color.  Absent: rash





Course


                                   Vital Signs











  03/07/25 03/08/25





  21:51 00:22


 


Temperature 97.8 F 98.2 F


 


Pulse Rate 105 H 84


 


Respiratory 18 16





Rate  


 


Blood Pressure 150/94 119/77


 


O2 Sat by Pulse 100 100





Oximetry  














Medical Decision Making





- Medical Decision Making


Was pt. sent in by a medical professional or institution (, PA, NP, urgent 

care, hospital, or nursing home...) When possible be specific


@  -No


Did you speak to anyone other than the patient for history (EMS, parent, family,

police, friend...)? What history was obtained from this source 


@  -No


Did you review nursing and triage notes (agree or disagree)?  Why? 


@  -I reviewed and agree with nursing and triage notes


Were old charts reviewed (outside hosp., previous admission, EMS record, old 

EKG, old radiological studies, urgent care reports/EKG's, nursing home records)?

Report findings 


@  -No old charts were reviewed


Differential Diagnosis (chest pain, altered mental status, abdominal pain women,

abdominal pain men, vaginal bleeding, weakness, fever, dyspnea, syncope, head

ache, dizziness, GI bleed, back pain, seizure, CVA, palpatations, mental health,

musculoskeletal)? 


@  -Electrolyte abnormality, viral illness, anxiety, infection... This list is 

not meant to be all-inclusive


EKG interpreted by me (3pts min.).


@  -As above


X-rays interpreted by me (1pt min.).


@  -None done


CT interpreted by me (1pt min.).


@  -None done


U/S interpreted by me (1pt. min.).


@  -None done


What testing was considered but not performed or refused? (CT, X-rays, U/S, 

labs)? Why?


@  -None


What meds were considered but not given or refused? Why?


@  -None


Did you discuss the management of the patient with other professionals 

(professionals i.e. , PA, NP, lab, RT, psych nurse, , , 

teacher, , )? Give summary


@  -No


Was smoking cessation discussed for >3mins.?


@  -No


Was critical care preformed (if so, how long)?


@  -No


Were there social determinants of health that impacted care today? How? 

(Homelessness, low income, unemployed, alcoholism, drug addiction, 

transportation, low edu. Level, literacy, decrease access to med. care, penitentiary, r

ehab)?


@  -No


Was there de-escalation of care discussed even if they declined (Discuss DNR or 

withdrawal of care, Hospice)? DNR status


@  -No


What co-morbidities impacted this encounter? (DM, HTN, Smoking, COPD, CAD, 

Cancer, CVA, ARF, Chemo, Hep., AIDS, mental health diagnosis, sleep apnea, 

morbid obesity)?


@  -None


Was patient admitted / discharged? Hospital course, mention meds given and 

route, prescriptions, significant lab abnormalities, going to OR and other 

pertinent info.


@  -Discharge.  34-year-old female presented the ER for evaluation of 

generalized pain.  Vitals acceptable limits.  Laboratory studies obtained 

unremarkable.  TSH 1.6.  Urinalysis with 57 RBCs and rare bacteria this is 

likely due to patient's menstrual cycle.  Viral swabs negative. EKG showing a 

sinus rhythm. Patient given symptomatic treatment in the ER with IV fluids and 

Toradol.  Patient is stable for discharge and close outpatient follow-up.  

Strict return parameters discussed.  Patient discharged in stable condition.  

Patient verbally expressed understanding agree with care plan.  Case discussed 

with ED attending, .


Undiagnosed new problem with uncertain prognosis?


@  -No


Drug Therapy requiring intensive monitoring for toxicity (Heparin, Nitro, 

Insulin, Cardizem)?


@  -No


Were any procedures done?


@  -No


Diagnosis/symptom?


@  -Generalized pain


Acute, or Chronic, or Acute on Chronic?


@  -No acute


Uncomplicated (without systemic symptoms) or Complicated (systemic symptoms)?


@  -Uncomplicated


Side effects of treatment?


@  -No


Exacerbation, Progression, or Severe Exacerbation?


@  -No


Poses a threat to life or bodily function? How? (Chest pain, USA, MI, pneumonia,

PE, COPD, DKA, ARF, appy, cholecystitis, CVA, Diverticulitis, Homicidal, 

Suicidal, threat to staff... and all critical care pts)


@  -No








- Lab Data


Result diagrams: 


                                 03/07/25 22:32





                                 03/07/25 22:32


                                   Lab Results











  03/07/25 03/07/25 03/07/25 Range/Units





  22:32 22:32 22:32 


 


WBC  9.5    (3.8-10.6)  k/uL


 


RBC  5.15    (3.80-5.40)  m/uL


 


Hgb  14.1    (11.4-16.0)  gm/dL


 


Hct  43.4    (34.0-46.0)  %


 


MCV  84.4    (80.0-100.0)  fL


 


MCH  27.4    (25.0-35.0)  pg


 


MCHC  32.5    (31.0-37.0)  g/dL


 


RDW  13.6    (11.5-15.5)  %


 


Plt Count  283    (150-450)  k/uL


 


MPV  7.7    


 


Neutrophils %  72    %


 


Lymphocytes %  24    %


 


Monocytes %  2    %


 


Eosinophils %  1    %


 


Basophils %  0    %


 


Neutrophils #  6.8    (1.3-7.7)  k/uL


 


Lymphocytes #  2.2    (1.0-4.8)  k/uL


 


Monocytes #  0.2    (0-1.0)  k/uL


 


Eosinophils #  0.1    (0-0.7)  k/uL


 


Basophils #  0.0    (0-0.2)  k/uL


 


Sodium     (137-145)  mmol/L


 


Potassium     (3.5-5.1)  mmol/L


 


Chloride     ()  mmol/L


 


Carbon Dioxide     (22-30)  mmol/L


 


Anion Gap     mmol/L


 


BUN     (7-17)  mg/dL


 


Creatinine     (0.52-1.04)  mg/dL


 


Est GFR (CKD-EPI)AfAm     (>60 ml/min/1.73 sqM)  


 


Est GFR (CKD-EPI)NonAf     (>60 ml/min/1.73 sqM)  


 


Glucose     (74-99)  mg/dL


 


Calcium     (8.4-10.2)  mg/dL


 


Total Bilirubin     (0.2-1.3)  mg/dL


 


AST     (14-36)  U/L


 


ALT     (4-34)  U/L


 


Alkaline Phosphatase     ()  U/L


 


Total Protein     (6.3-8.2)  g/dL


 


Albumin     (3.5-5.0)  g/dL


 


TSH     (0.465-4.680)  mIU/L


 


Urine Color   Colorless   


 


Urine Appearance   Clear   (Clear)  


 


Urine pH   6.5   (5.0-8.0)  


 


Ur Specific Gravity   1.004   (1.001-1.035)  


 


Urine Protein   Negative   (Negative)  


 


Urine Glucose (UA)   Negative   (Negative)  


 


Urine Ketones   Trace H   (Negative)  


 


Urine Blood   Large H   (Negative)  


 


Urine Nitrite   Negative   (Negative)  


 


Urine Bilirubin   Negative   (Negative)  


 


Urine Urobilinogen   <2.0   (<2.0)  mg/dL


 


Ur Leukocyte Esterase   Negative   (Negative)  


 


Urine RBC   57 H   (0-5)  /hpf


 


Urine WBC   1   (0-5)  /hpf


 


Ur Squamous Epith Cells   <1   (0-4)  /hpf


 


Urine Bacteria   Rare H   (None)  /hpf


 


Urine Yeast (Budding)   Rare H   (None)  /hpf


 


Urine HCG, Qual    Not Detected  (Not Detectd)  


 


Influenza Type A (PCR)     (Not Detectd)  


 


Influenza Type B (PCR)     (Not Detectd)  


 


RSV (PCR)     (Not Detectd)  


 


SARS-CoV-2 (PCR)     (Not Detectd)  














  03/07/25 03/07/25 Range/Units





  22:32 22:32 


 


WBC    (3.8-10.6)  k/uL


 


RBC    (3.80-5.40)  m/uL


 


Hgb    (11.4-16.0)  gm/dL


 


Hct    (34.0-46.0)  %


 


MCV    (80.0-100.0)  fL


 


MCH    (25.0-35.0)  pg


 


MCHC    (31.0-37.0)  g/dL


 


RDW    (11.5-15.5)  %


 


Plt Count    (150-450)  k/uL


 


MPV    


 


Neutrophils %    %


 


Lymphocytes %    %


 


Monocytes %    %


 


Eosinophils %    %


 


Basophils %    %


 


Neutrophils #    (1.3-7.7)  k/uL


 


Lymphocytes #    (1.0-4.8)  k/uL


 


Monocytes #    (0-1.0)  k/uL


 


Eosinophils #    (0-0.7)  k/uL


 


Basophils #    (0-0.2)  k/uL


 


Sodium  137   (137-145)  mmol/L


 


Potassium  3.8   (3.5-5.1)  mmol/L


 


Chloride  103   ()  mmol/L


 


Carbon Dioxide  26   (22-30)  mmol/L


 


Anion Gap  8   mmol/L


 


BUN  13   (7-17)  mg/dL


 


Creatinine  0.90   (0.52-1.04)  mg/dL


 


Est GFR (CKD-EPI)AfAm  >90   (>60 ml/min/1.73 sqM)  


 


Est GFR (CKD-EPI)NonAf  84   (>60 ml/min/1.73 sqM)  


 


Glucose  83   (74-99)  mg/dL


 


Calcium  9.4   (8.4-10.2)  mg/dL


 


Total Bilirubin  0.5   (0.2-1.3)  mg/dL


 


AST  29   (14-36)  U/L


 


ALT  21   (4-34)  U/L


 


Alkaline Phosphatase  70   ()  U/L


 


Total Protein  7.1   (6.3-8.2)  g/dL


 


Albumin  4.5   (3.5-5.0)  g/dL


 


TSH  1.600   (0.465-4.680)  mIU/L


 


Urine Color    


 


Urine Appearance    (Clear)  


 


Urine pH    (5.0-8.0)  


 


Ur Specific Gravity    (1.001-1.035)  


 


Urine Protein    (Negative)  


 


Urine Glucose (UA)    (Negative)  


 


Urine Ketones    (Negative)  


 


Urine Blood    (Negative)  


 


Urine Nitrite    (Negative)  


 


Urine Bilirubin    (Negative)  


 


Urine Urobilinogen    (<2.0)  mg/dL


 


Ur Leukocyte Esterase    (Negative)  


 


Urine RBC    (0-5)  /hpf


 


Urine WBC    (0-5)  /hpf


 


Ur Squamous Epith Cells    (0-4)  /hpf


 


Urine Bacteria    (None)  /hpf


 


Urine Yeast (Budding)    (None)  /hpf


 


Urine HCG, Qual    (Not Detectd)  


 


Influenza Type A (PCR)   Not Detected  (Not Detectd)  


 


Influenza Type B (PCR)   Not Detected  (Not Detectd)  


 


RSV (PCR)   Not Detected  (Not Detectd)  


 


SARS-CoV-2 (PCR)   Not Detected  (Not Detectd)  














- EKG Data


-: EKG Interpreted by Me


EKG Comments: 





EKG taken at 22: 51 showing a sinus rhythm.  Ventricular rate 86, MS interval 

193, QRS duration 77, QT/QTc 357/400.





Disposition


Clinical Impression: 


 Generalized pain





Disposition: HOME SELF-CARE


Condition: Stable


Additional Instructions: 


Follow-up with PCP.  Return to the ER for any new or worsening concerns.


Is patient prescribed a controlled substance at d/c from ED?: No


Referrals: 


Swapna Menon MD [Primary Care Provider] - 1-2 days


Time of Disposition: 00:55

## 2025-03-08 VITALS
SYSTOLIC BLOOD PRESSURE: 119 MMHG | DIASTOLIC BLOOD PRESSURE: 77 MMHG | TEMPERATURE: 98.2 F | HEART RATE: 84 BPM | RESPIRATION RATE: 16 BRPM

## 2025-03-28 ENCOUNTER — HOSPITAL ENCOUNTER (OUTPATIENT)
Dept: HOSPITAL 47 - RADMRIMAIN | Age: 35
Discharge: HOME | End: 2025-03-28
Attending: FAMILY MEDICINE
Payer: COMMERCIAL

## 2025-03-28 DIAGNOSIS — M50.322: Primary | ICD-10-CM

## 2025-03-28 PROCEDURE — 72141 MRI NECK SPINE W/O DYE: CPT

## 2025-03-28 NOTE — MR
INDICATION: 

Patient age:Female;  34 years old; 

Reason for study: M54.2 headaches; PHH.

 

COMPARISON: None.

 

TECHNIQUE: Multi planar, multi sequence imaging was performed of the cervical spine. No Gadolinium wa
s given.

 

FINDINGS: 

Alignment: The cervical vertebral bodies have preserved heights. Alignment is within normal limits gi
suzy patient positioning. 

 

Bones: Bone signal is within normal limits. Anterior osteophytosis at T2-T3. No abnormal STIR signal.


 

Cord: The spinal cord is unremarkable with regards to their signal intensity and morphology. 

 

Discs:  Intervertebral disc signal is maintained.

 

C2-C3: No significant disc pathology. The spinal canal is patent.  No neural foraminal stenosis.

 

C3-C4: No significant disc pathology. The spinal canal is patent.  No neural foraminal stenosis.

 

C4-C5: Minimal broad-based disc bulge without effacement of the anterior thecal sac. No significant c
entral canal stenosis. No neural foraminal stenosis.

 

C5-C6: Minimal broad-based disc bulge with minimal effacement of the anterior thecal sac. No signific
ant central canal stenosis.  No neural foraminal stenosis.

 

C6-C7: No significant disc pathology. The spinal canal is patent.  No neural foraminal stenosis.

 

C7-T1: No significant disc pathology. The spinal canal is patent.  No neural foraminal stenosis.

 

Other: None.

 

IMPRESSION:

1. No evidence for disc herniation or significant spinal canal stenosis.

 

2. Minimal degenerative disc disease at C4-C5 and C5-C6.

 

[facility], Workstation: [id number], 3/28/2025 1:08 PM

## 2025-04-25 ENCOUNTER — HOSPITAL ENCOUNTER (EMERGENCY)
Dept: HOSPITAL 47 - EC | Age: 35
Discharge: HOME | End: 2025-04-25
Payer: COMMERCIAL

## 2025-04-25 VITALS — HEART RATE: 68 BPM | RESPIRATION RATE: 16 BRPM

## 2025-04-25 VITALS — SYSTOLIC BLOOD PRESSURE: 136 MMHG | DIASTOLIC BLOOD PRESSURE: 68 MMHG

## 2025-04-25 VITALS — TEMPERATURE: 97.9 F

## 2025-04-25 DIAGNOSIS — Z88.2: ICD-10-CM

## 2025-04-25 DIAGNOSIS — F17.290: ICD-10-CM

## 2025-04-25 DIAGNOSIS — Z88.1: ICD-10-CM

## 2025-04-25 DIAGNOSIS — R51.9: Primary | ICD-10-CM

## 2025-04-25 PROCEDURE — 99284 EMERGENCY DEPT VISIT MOD MDM: CPT

## 2025-04-25 PROCEDURE — 70450 CT HEAD/BRAIN W/O DYE: CPT

## 2025-04-25 PROCEDURE — 96374 THER/PROPH/DIAG INJ IV PUSH: CPT

## 2025-04-25 PROCEDURE — 96375 TX/PRO/DX INJ NEW DRUG ADDON: CPT

## 2025-04-25 RX ADMIN — KETOROLAC TROMETHAMINE STA MG: 15 INJECTION, SOLUTION INTRAMUSCULAR; INTRAVENOUS at 16:52

## 2025-04-25 RX ADMIN — DIPHENHYDRAMINE HYDROCHLORIDE STA MG: 50 INJECTION, SOLUTION INTRAMUSCULAR; INTRAVENOUS at 16:52

## 2025-04-25 RX ADMIN — METOCLOPRAMIDE STA MG: 5 INJECTION, SOLUTION INTRAMUSCULAR; INTRAVENOUS at 16:52

## 2025-04-25 RX ADMIN — DIPHENHYDRAMINE HYDROCHLORIDE STA: 50 INJECTION, SOLUTION INTRAMUSCULAR; INTRAVENOUS at 18:00

## 2025-07-04 ENCOUNTER — HOSPITAL ENCOUNTER (EMERGENCY)
Dept: HOSPITAL 47 - EC | Age: 35
Discharge: HOME | End: 2025-07-04
Payer: COMMERCIAL

## 2025-07-04 VITALS
SYSTOLIC BLOOD PRESSURE: 112 MMHG | HEART RATE: 88 BPM | DIASTOLIC BLOOD PRESSURE: 86 MMHG | RESPIRATION RATE: 16 BRPM | TEMPERATURE: 98 F

## 2025-07-04 DIAGNOSIS — Z88.1: ICD-10-CM

## 2025-07-04 DIAGNOSIS — L03.213: Primary | ICD-10-CM

## 2025-07-04 DIAGNOSIS — F17.290: ICD-10-CM

## 2025-07-04 DIAGNOSIS — Z88.8: ICD-10-CM

## 2025-07-04 DIAGNOSIS — Z88.2: ICD-10-CM

## 2025-07-04 PROCEDURE — 99283 EMERGENCY DEPT VISIT LOW MDM: CPT

## 2025-07-08 ENCOUNTER — HOSPITAL ENCOUNTER (EMERGENCY)
Dept: HOSPITAL 47 - EC | Age: 35
LOS: 1 days | Discharge: HOME | End: 2025-07-09
Payer: COMMERCIAL

## 2025-07-08 VITALS — TEMPERATURE: 98.2 F

## 2025-07-08 DIAGNOSIS — F17.290: ICD-10-CM

## 2025-07-08 DIAGNOSIS — Z88.1: ICD-10-CM

## 2025-07-08 DIAGNOSIS — R11.2: Primary | ICD-10-CM

## 2025-07-08 DIAGNOSIS — Z88.8: ICD-10-CM

## 2025-07-08 DIAGNOSIS — Z88.2: ICD-10-CM

## 2025-07-08 LAB
ANISOCYTOSIS BLD QL SMEAR: (no result)
BACTERIA UR QL AUTO: (no result) /HPF
BASO STIPL BLD QL SMEAR: (no result)
BROAD CASTS [PRESENCE] IN URINE BY COMPUTER ASSISTED METHOD: (no result) /LPF
BURR CELLS BLD QL SMEAR: (no result)
CA CARBONATE CRY #/AREA URNS HPF: (no result) /HPF
CA PHOS CRY UR QL COMP ASSIST: (no result) /HPF
CAOX CRY UR QL COMP ASSIST: (no result) /HPF
CASTS URNS QL MICRO: (no result) /LPF
CRYSTALS UR QL: (no result) /HPF
CYSTINE CRY #/AREA URNS HPF: (no result) /HPF
DACRYOCYTES BLD QL SMEAR: (no result)
DOHLE BOD BLD QL SMEAR: (no result)
EPITHELIAL CASTS [PRESENCE] IN URINE BY COMPUTER ASSISTED METHOD: (no result) /LPF
ERYTHROCYTE CLUMPS [PRESENCE] IN URINE BY COMPUTER ASSISTED METHOD: (no result) /HPF
FATTY CASTS #/AREA UR COMP ASSIST: (no result) /LPF
GRAN CASTS UR QL COMP ASSIST: (no result) /LPF
HOWELL-JOLLY BOD BLD QL SMEAR: (no result)
HYPOCHROMIA BLD QL SMEAR: (no result)
LEUCINE CRYSTALS [PRESENCE] IN URINE BY COMPUTER ASSISTED METHOD: (no result) /HPF
LG PLATELETS BLD QL SMEAR: (no result)
Lab: (no result)
MIXED CELL CASTS UR QL COMP ASSIST: (no result) /LPF
NEUTS HYPERSEG # BLD: (no result) 10*3/UL
NRBC BLD AUTO-RTO: (no result) %
OVAL FAT BODIES #/AREA UR COMP ASSIST: (no result) /HPF
OVALOCYTES BLD QL SMEAR: (no result)
PELGER HUET CELLS BLD QL SMEAR: (no result)
PLATELETS.RETICULATED NFR BLD AUTO: (no result) %
POIKILOCYTOSIS BLD QL SMEAR: (no result)
POIKILOCYTOSIS BLD QL SMEAR: (no result)
POLYCHROMASIA BLD QL SMEAR: (no result)
RBC CASTS UR QL COMP ASSIST: (no result) /LPF
RBC MORPH BLD: (no result)
RENAL EPI CELLS UR QL COMP ASSIST: (no result) /HPF
ROULEAUX BLD QL SMEAR: (no result)
SCHISTOCYTES BLD QL SMEAR: (no result)
SICKLE CELLS BLD QL SMEAR: (no result)
SPERM # UR AUTO: (no result) /HPF
SPHEROCYTES BLD QL SMEAR: (no result)
STOMATOCYTES BLD QL SMEAR: (no result)
TARGETS BLD QL SMEAR: (no result)
TOXIC GRANULES BLD QL SMEAR: (no result)
TRANS CELLS UR QL COMP ASSIST: (no result) /HPF (ref 0–1)
TRI-PHOS CRY UR QL COMP ASSIST: (no result) /HPF
TRICHOMONAS UR QL COMP ASSIST: (no result) /HPF
TYROSINE CRYSTALS [PRESENCE] IN URINE BY COMPUTER ASSISTED METHOD: (no result) /HPF
URATE CRY UR QL COMP ASSIST: (no result) /HPF
VARIANT LYMPHS BLD QL SMEAR: (no result)
WAXY CASTS #/AREA UR COMP ASSIST: (no result) /LPF
WBC CASTS #/AREA URNS LPF: (no result) /LPF
WBC CLUMPS UR QL AUTO: (no result) /HPF
WBC NRBC COR # BLD: (no result) K/UL
WBC TOXIC VACUOLES BLD QL SMEAR: (no result)
YEAST BUDDING UR QL COMP ASSIST: (no result) /HPF
YEAST HYPHAE UR QL COMP ASSIST: (no result) /HPF

## 2025-07-08 PROCEDURE — 81025 URINE PREGNANCY TEST: CPT

## 2025-07-08 PROCEDURE — 80053 COMPREHEN METABOLIC PANEL: CPT

## 2025-07-08 PROCEDURE — 81001 URINALYSIS AUTO W/SCOPE: CPT

## 2025-07-08 PROCEDURE — 96374 THER/PROPH/DIAG INJ IV PUSH: CPT

## 2025-07-08 PROCEDURE — 96361 HYDRATE IV INFUSION ADD-ON: CPT

## 2025-07-08 PROCEDURE — 36415 COLL VENOUS BLD VENIPUNCTURE: CPT

## 2025-07-08 PROCEDURE — 85025 COMPLETE CBC W/AUTO DIFF WBC: CPT

## 2025-07-08 PROCEDURE — 96375 TX/PRO/DX INJ NEW DRUG ADDON: CPT

## 2025-07-08 PROCEDURE — 83690 ASSAY OF LIPASE: CPT

## 2025-07-08 PROCEDURE — 99284 EMERGENCY DEPT VISIT MOD MDM: CPT

## 2025-07-08 RX ADMIN — ONDANSETRON STA MG: 2 INJECTION INTRAMUSCULAR; INTRAVENOUS at 23:36

## 2025-07-08 RX ADMIN — CEFAZOLIN STA MLS/HR: 330 INJECTION, POWDER, FOR SOLUTION INTRAMUSCULAR; INTRAVENOUS at 23:36

## 2025-07-08 RX ADMIN — FAMOTIDINE STA MG: 10 INJECTION, SOLUTION INTRAVENOUS at 23:40

## 2025-07-08 RX ADMIN — KETOROLAC TROMETHAMINE STA MG: 15 INJECTION, SOLUTION INTRAMUSCULAR; INTRAVENOUS at 23:38

## 2025-07-08 RX ADMIN — PANTOPRAZOLE SODIUM STA MG: 40 INJECTION, POWDER, FOR SOLUTION INTRAVENOUS at 23:40

## 2025-07-09 VITALS — HEART RATE: 82 BPM | SYSTOLIC BLOOD PRESSURE: 119 MMHG | DIASTOLIC BLOOD PRESSURE: 76 MMHG | RESPIRATION RATE: 18 BRPM

## 2025-07-09 LAB
ALBUMIN SERPL-MCNC: 4.9 G/DL (ref 3.5–5)
ALP SERPL-CCNC: 79 U/L (ref 38–126)
ALT SERPL-CCNC: 20 U/L (ref 4–34)
AMORPH SED URNS QL MICRO: (no result) /HPF
ANION GAP SERPL CALC-SCNC: 12 MMOL/L
APPEARANCE UR: (no result)
AST SERPL-CCNC: 25 U/L (ref 14–36)
BASOPHILS # BLD AUTO: 0.06 10*3/UL (ref 0–0.1)
BASOPHILS NFR BLD AUTO: 0.4 %
BILIRUB BLD-MCNC: 0.8 MG/DL (ref 0.2–1.3)
BILIRUB UR QL CFM: (no result)
BILIRUB UR QL STRIP.AUTO: NEGATIVE
BUN SERPL-SCNC: 11 MG/DL (ref 7–17)
CALCIUM SPEC-MCNC: 10.5 MG/DL (ref 8.4–10.2)
CHLORIDE SERPL-SCNC: 102 MMOL/L (ref 98–107)
CO2 SERPL-SCNC: 25 MMOL/L (ref 22–30)
COLOR UR: YELLOW
CREATININE: 0.73 MG/DL (ref 0.52–1.04)
EOSINOPHIL # BLD AUTO: 0.01 10*3/UL (ref 0.04–0.35)
EOSINOPHIL NFR BLD AUTO: 0.1 %
ERYTHROCYTE [DISTWIDTH] IN BLOOD BY AUTOMATED COUNT: 5.66 10*6/UL (ref 4.1–5.2)
ERYTHROCYTE [DISTWIDTH] IN BLOOD: 13.4 % (ref 11.5–14.5)
GLUCOSE SERPL-MCNC: 92 MG/DL (ref 74–99)
GLUCOSE UR QL: NEGATIVE
HCT VFR BLD AUTO: 46.8 % (ref 37.2–46.3)
HGB BLD-MCNC: 16 G/DL (ref 12–15)
HYALINE CASTS UR QL AUTO: 4 /LPF (ref 0–2)
IMM GRANULOCYTES # BLD: 0.04 10*3/UL (ref 0–0.04)
KETONES UR QL STRIP.AUTO: (no result)
LEUKOCYTE ESTERASE UR QL STRIP.AUTO: (no result)
LIPASE SERPL-CCNC: 51 U/L (ref 23–300)
LYMPHOCYTES # SPEC AUTO: 1.02 10*3/UL (ref 0.9–5)
LYMPHOCYTES NFR SPEC AUTO: 7.6 %
MCH RBC QN AUTO: 28.3 PG (ref 27–32)
MCHC RBC AUTO-ENTMCNC: 34.2 G/DL (ref 32–37)
MCV RBC AUTO: 82.7 FL (ref 80–97)
MONOCYTES # BLD AUTO: 0.33 10*3/UL (ref 0.2–1)
MONOCYTES NFR BLD AUTO: 2.5 %
MUCOUS THREADS UR QL AUTO: (no result) /HPF
NEUTROPHILS # BLD AUTO: 11.92 10*3/UL (ref 1.8–7.7)
NEUTROPHILS NFR BLD AUTO: 89.1 %
NITRITE UR QL STRIP.AUTO: NEGATIVE
PH UR: 5.5 [PH] (ref 5–8)
PLATELET # BLD AUTO: 266 10*3/UL (ref 140–440)
PMV BLD AUTO: 10.5 FL (ref 9.5–12.2)
POTASSIUM SERPL-SCNC: 4.6 MMOL/L (ref 3.5–5.1)
PROT SERPL-MCNC: 7.7 G/DL (ref 6.3–8.2)
PROT UR QL SSA: (no result)
PROT UR QL: (no result)
RBC UR QL: (no result)
RBC UR QL: 4 /HPF (ref 0–5)
SODIUM SERPL-SCNC: 139 MMOL/L (ref 137–145)
SP GR UR: 1.03 (ref 1–1.03)
SQUAMOUS UR QL AUTO: 25 /HPF (ref 0–4)
UROBILINOGEN UR QL STRIP: <2 MG/DL (ref ?–2)
WBC # BLD AUTO: 13.38 10*3/UL (ref 4.5–10)
WBC # UR AUTO: 10 /HPF (ref 0–5)

## 2025-07-09 RX ADMIN — ONDANSETRON STA EACH: 4 TABLET, ORALLY DISINTEGRATING ORAL at 02:15
